# Patient Record
Sex: FEMALE | Race: WHITE
[De-identification: names, ages, dates, MRNs, and addresses within clinical notes are randomized per-mention and may not be internally consistent; named-entity substitution may affect disease eponyms.]

---

## 2018-09-28 ENCOUNTER — HOSPITAL ENCOUNTER (EMERGENCY)
Dept: HOSPITAL 77 - KA.ED | Age: 83
Discharge: HOME | End: 2018-09-28
Payer: MEDICARE

## 2018-09-28 DIAGNOSIS — S09.90XA: Primary | ICD-10-CM

## 2018-09-28 DIAGNOSIS — S00.03XA: ICD-10-CM

## 2018-09-28 DIAGNOSIS — S00.31XA: ICD-10-CM
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
## 2018-09-28 NOTE — EDM.PDOC
ED HPI GENERAL MEDICAL PROBLEM





- General


Chief Complaint: General


Stated Complaint: FELL AND HIT HER HEAD


Time Seen by Provider: 09/28/18 17:55


Source of Information: Reports: Patient, Family (daughters)


History Limitations: Reports: No Limitations





- History of Present Illness


INITIAL COMMENTS - FREE TEXT/NARRATIVE: 





Pleasant 85-year-old female in her normal state of health was taking some peach 

towels she hung out to dry on her clothes  and was trying to retrieve 

these because the wind was picking up, she lost her footing and tripped on the 

sidewalk falling directly onto her face. She noticed bleeding immediately 

coming from her nose. She was unable to get up on her own and pushed her 

Lifeline that she wears around her neck. Her daughter was notified and she seen 

that her mother had an abrasions and was bleeding from the nose and had a 

swelling on her head frontal lobe. Patient denies any loss of consciousness. 

She denies headache. She denies double vision. She denies jaw pain or 

difficulty talking. She denies increased pain or discomfort earlier difficulty 

with balance when she was able to stand for ambulation. She has no numbness or 

tingling in her extremities she denies any ear pain or bleeding from the ears. 

She has no dental pain or bleeding from the mouth. She denies any difficulty 

breathing, labored breathing or shortness of breath. She she denies chest pain. 

She was brought in by her daughter to the emergency room to be evaluated. She 

is not on any blood thinners or anticoagulation therapy


Onset: Today


Onset Date: 09/28/18


Duration: Minutes:


Location: Reports: Head, Face


Quality: Reports: Throbbing


Severity: Mild


Improves with: Reports: Rest


Worsens with: Reports: None


Associated Symptoms: Reports: No Other Symptoms.  Denies: Confusion, Chest Pain

, Headaches, Nausea/Vomiting, Seizure, Shortness of Breath, Syncope, Weakness





- Related Data


 Allergies











Allergy/AdvReac Type Severity Reaction Status Date / Time


 


No Known Drug Allergies Allergy  Cannot Verified 09/28/18 18:05





   Remember  











Home Meds: 


 Home Meds





. [No Known Home Meds]  09/28/18 [History]











Social & Family History





- Tobacco Use


Smoking Status *Q: Never Smoker


Second Hand Smoke Exposure: No





- Caffeine Use


Caffeine Use: Reports: Coffee, Soda





- Recreational Drug Use


Recreational Drug Use: No





ED ROS GENERAL





- Review of Systems


Review Of Systems: ROS reveals no pertinent complaints other than HPI.





ED EXAM, GENERAL





- Physical Exam


Exam: See Below


Exam Limited By: No Limitations


General Appearance: Alert, WD/WN, No Apparent Distress


Eye Exam: Bilateral Eye: EOMI, PERRL


Ears: Normal External Exam, Hearing Grossly Normal, Normal TMs


Nose: Other (Abrasion over the nose. Patient has no tenderness to palpation or 

crepitation of the nose)


Throat/Mouth: Normal Inspection, Normal Lips, Normal Oropharynx, Normal Voice, 

No Airway Compromise


Head: Other (Abrasion over the frontal lobe of the head with hematoma of the 

scalp frontal lobe)


Neck: Normal Inspection, Supple, Non-Tender, Full Range of Motion


Respiratory/Chest: No Respiratory Distress, Lungs Clear, Normal Breath Sounds


Cardiovascular: Normal Peripheral Pulses, Regular Rate, Rhythm


GI/Abdominal: Soft


Back Exam: Normal Inspection, Full Range of Motion


Extremities: Normal Inspection, Normal Range of Motion, Non-Tender, Normal 

Capillary Refill


Neurological: Alert, Oriented, CN II-XII Intact, Normal Cognition, Normal Gait, 

No Motor/Sensory Deficits


Psychiatric: Normal Affect, Normal Mood


Skin Exam: Other (Abrasions that were cleaned up with the watering triple 

antibiotic placed over the frontal aspect of the head and scalp and nose)


Lymphatic: No Adenopathy





Course





- Vital Signs


Last Recorded V/S: 


 Last Vital Signs











Temp  97.3 F   09/28/18 17:57


 


Pulse  88   09/28/18 17:57


 


Resp  20   09/28/18 17:57


 


BP  175/100 H  09/28/18 17:57


 


Pulse Ox  95   09/28/18 17:57














- Orders/Labs/Meds


Orders: 


 Active Orders 24 hr











 Category Date Time Status


 


 Head wo Cont [CT] Stat Exams  09/28/18 18:02 Ordered














- Radiology Interpretation


CT Results Date: 09/28/18


CT Results Time: 19:40





- Re-Assessments/Exams


Free Text/Narrative Re-Assessment/Exam: 





09/28/18 19:44


CT head wo iv





Findings:





Soft tissue swelling seen in the frontal region.


There is no mass or mass effect


There are no extra-axial fluid collection


There is no hemorrhage or hydrocephalus


There are no sites of abnormal attenuation





Impression:





No plain CT evidence of acute intracranial process








Departure





- Departure


Time of Disposition: 19:46


Disposition: Home, Self-Care 01


Condition: Good


Clinical Impression: 


Head trauma


Qualifiers:


 Encounter type: initial encounter Qualified Code(s): S09.90XA - Unspecified 

injury of head, initial encounter





Hematoma of frontal scalp


Qualifiers:


 Encounter type: initial encounter Qualified Code(s): S00.03XA - Contusion of 

scalp, initial encounter








- Discharge Information


Instructions:  Head Injury, Adult


Referrals: 


PCP,Not In Area [Primary Care Provider] - 


Forms:  ED Department Discharge


Additional Instructions: 


1. Patient should have observation over the next 12-14 hours.


2. Any change in mental status patient should be brought back into the 

emergency room for reevaluation.


3. Follow-up with your primary care next week just for quick recheck.





- My Orders


Last 24 Hours: 


My Active Orders





09/28/18 18:02


Head wo Cont [CT] Stat 














- Assessment/Plan


Last 24 Hours: 


My Active Orders





09/28/18 18:02


Head wo Cont [CT] Stat 











Assessment:: 





1. Head trauma


2. Frontal scalp hematoma


Plan: 





1. Change dressing in 3 days.


2. Keep finger clean and dry.


3. F/U with Primary care if redness or signs of infection are concern

## 2019-04-06 ENCOUNTER — HOSPITAL ENCOUNTER (INPATIENT)
Dept: HOSPITAL 52 - LL.MS | Age: 84
LOS: 13 days | Discharge: HOME | DRG: 561 | End: 2019-04-19
Attending: FAMILY MEDICINE | Admitting: EMERGENCY MEDICINE
Payer: MEDICARE

## 2019-04-06 DIAGNOSIS — R42: ICD-10-CM

## 2019-04-06 DIAGNOSIS — G89.29: ICD-10-CM

## 2019-04-06 DIAGNOSIS — M81.0: ICD-10-CM

## 2019-04-06 DIAGNOSIS — Z79.899: ICD-10-CM

## 2019-04-06 DIAGNOSIS — E11.9: ICD-10-CM

## 2019-04-06 DIAGNOSIS — E66.9: ICD-10-CM

## 2019-04-06 DIAGNOSIS — Z96.652: ICD-10-CM

## 2019-04-06 DIAGNOSIS — H91.90: ICD-10-CM

## 2019-04-06 DIAGNOSIS — E78.00: ICD-10-CM

## 2019-04-06 DIAGNOSIS — Z47.1: Primary | ICD-10-CM

## 2019-04-06 DIAGNOSIS — M54.9: ICD-10-CM

## 2019-04-06 DIAGNOSIS — Z79.84: ICD-10-CM

## 2019-04-06 DIAGNOSIS — R55: ICD-10-CM

## 2019-04-06 DIAGNOSIS — E83.42: ICD-10-CM

## 2019-04-06 RX ADMIN — METFORMIN HYDROCHLORIDE SCH MG: 500 TABLET, EXTENDED RELEASE ORAL at 20:07

## 2019-04-06 NOTE — PCM.HP
H&P History of Present Illness





- General


Date of Service: 19


Admit Problem/Dx: 


 Admission Diagnosis/Problem





Admission Diagnosis/Problem      Arthroplasty of knee








Source of Information: Patient, Family


History Limitations: Reports: No Limitations





- History of Present Illness


Initial Comments - Free Text/Narative: 





Patient admitted for PT and OT s/p left TKA performed at [facility]. 


Has been doing well post-operatively. 





- Related Data


Allergies/Adverse Reactions: 


 Allergies











Allergy/AdvReac Type Severity Reaction Status Date / Time


 


No Known Drug Allergies Allergy  Cannot Verified 18 18:05





   Remember  











Home Medications: 


 Home Meds





Acetaminophen 325 mg PO Q6HR 19 [History]


Apixaban [Eliquis] 2.5 mg PO BID 19 [History]


Celecoxib 1 cap PO BID 19 [History]


Ondansetron [Zofran] 8 mg PO Q4H PRN 19 [History]


Polyethylene Glycol 3350 [MiraLAX] 17 gm PO DAILY 19 [History]


metFORMIN [Glucophage XR] 500 mg PO BEDTIME 19 [History]


oxyCODONE 1 tab PO Q6HR PRN 19 [History]


traMADol [Ultram] 1 tab PO Q8HR 19 [History]











Past Medical History


HEENT History: Reports: Hard of Hearing, Other (See Below)


Other HEENT History: bilat hearing aides


Cardiovascular History: Reports: High Cholesterol


Respiratory History: Reports: Other (See Below)


Other Respiratory History: oxygen usage at night


Genitourinary History: Reports: Urinary Incontinence


OB/GYN History: Reports: None


Musculoskeletal History: Reports: Arthritis, Back Pain, Chronic, Osteoarthritis

, Osteoporosis


Neurological History: Reports: None


Endocrine/Metabolic History: Reports: Diabetes, Type II, Obesity/BMI 30+


Dermatologic History: Reports: None





- Infectious Disease History


Infectious Disease History: Reports: None





- Past Surgical History


Cardiovascular Surgical History: Reports: None


Respiratory Surgical History: Reports: None


GI Surgical History: Reports: Colonoscopy


Female  Surgical History: Reports:  Section


Neurological Surgical History: Reports: None


Musculoskeletal Surgical History: Reports: Arthroscopic Knee


Dermatological Surgical History: Reports: None





Social & Family History





- Caffeine Use


Caffeine Use: Reports: Coffee, Soda





- Alcohol Use


Alcohol Use History: No





- Recreational Drug Use


Recreational Drug Use: No


Drug Use in Last 12 Months: No





H&P Review of Systems





- Review of Systems:


Review Of Systems: See Below


General: Reports: No Symptoms


HEENT: Reports: Glasses, Hearing Changes (wears hearing aids)


Pulmonary: Reports: No Symptoms


Cardiovascular: Reports: No Symptoms.  Denies: Chest Pain


Gastrointestinal: Reports: Nausea (intermittent nausea since surgery).  Denies: 

Constipation, Diarrhea, Distension


Genitourinary: Reports: No Symptoms


Musculoskeletal: Reports: Other (has some generalized chronic baseline 

arthralgias.  s/p left TKA. )


Skin: Reports: Other (healing incision left knee)


Psychiatric: Reports: No Symptoms


Neurological: Reports: No Symptoms


Hematologic/Lymphatic: Reports: No Symptoms





Exam





- Exam


Exam: See Below





- Exam


General: Alert, Oriented, Cooperative


HEENT: Conjunctiva Clear, EACs Clear, EOMI, Hearing Intact (with hearing aids), 

Mucosa Moist & Pink, Nares Patent, Pupils Equal, Pupils Reactive


Neck: Supple, Trachea Midline


Lungs: Clear to Auscultation, Normal Respiratory Effort


Cardiovascular: Regular Rate, Regular Rhythm


GI/Abdominal Exam: Normal Bowel Sounds, Soft, Non-Tender, No Distention


 (Female) Exam: Deferred


Rectal (Female) Exam: Deferred


Back Exam: Normal Inspection.  No: Muscle Spasm, Paraspinal Tenderness, 

Vertebral Tenderness


Extremities: Normal Capillary Refill, Other (mild discomfort with palpation 

left knee, bandage left in place over incision site)


Skin: Warm, Dry


Neurological: Strength Equal Bilateral, Normal Speech, Normal Tone


Neuro Extensive - Mental Status: Alert, Oriented x3, Normal Mood/Affect, Normal 

Cognition, Memory Intact





- Problem List


(1) S/P total knee arthroplasty


SNOMED Code(s): 1768994101703, 189084897, 4590800088671


   ICD Code: Z96.659 - PRESENCE OF UNSPECIFIED ARTIFICIAL KNEE JOINT   Status: 

Acute   Priority: High   Current Visit: Yes   Problem Details: Admitted to 

Swing Bed for PT/OT s/p TKA   


Qualifiers: 


   Laterality: left   Qualified Code(s): Z96.652 - Presence of left artificial 

knee joint   





(2) Diabetes mellitus, type 2


SNOMED Code(s): 51555883


   ICD Code: E11.9 - TYPE 2 DIABETES MELLITUS WITHOUT COMPLICATIONS   Status: 

Chronic   Priority: Low   Current Visit: Yes   Problem Details: Stable per 

Ca.  Will perform daily bedside glucose checks and A1C and follow patient'

s trends.  Patient currently on Metformin.  Blood glucose levels have recently 

improved over her recent hospitalization most likely due to dietary changes and 

decreased overall PO intake.    


Qualifiers: 


   Diabetes mellitus long term insulin use: without long term use   Diabetes 

mellitus complication status: without complication   Qualified Code(s): E11.9 - 

Type 2 diabetes mellitus without complications   





(3) Hyperlipidemia


SNOMED Code(s): 91945301


   ICD Code: E78.5 - HYPERLIPIDEMIA, UNSPECIFIED   Status: Chronic   Priority: 

Low   Current Visit: No   Problem Details: Stable per patient   





(4) Osteoarthritis


SNOMED Code(s): 535455733


   ICD Code: M19.90 - UNSPECIFIED OSTEOARTHRITIS, UNSPECIFIED SITE   Status: 

Acute   Priority: Low   Current Visit: No   Problem Details: generalized 

osteoarthritis, stable overall   


Qualifiers: 


   Osteoarthritis location: unspecified site 


Problem List Initiated/Reviewed/Updated: Yes


Orders Last 24hrs: 


 Active Orders 24 hr











 Category Date Time Status


 


 Patient Status [ADT] Routine ADT  19 13:31 Ordered


 


 Blood Glucose Check, Bedside [RC] BIDMEALS Care  19 13:31 Ordered


 


 Height and Weight [RC] UPON Care  19 13:31 Ordered


 


 May Shower [RC] ASDIRECTED Care  19 13:31 Ordered


 


 Oxygen Therapy [RC] PRN Care  19 13:31 Ordered


 


 Pulse Oximetry [RC] PRN Care  19 13:35 Ordered


 


 Up With Assistance [RC] ASDIRECTED Care  19 13:31 Ordered


 


 VTE/DVT Education [RC] PER UNIT ROUTINE Care  19 13:31 Ordered


 


 Vital Signs [RC] QSHIFT Care  19 13:31 Ordered


 


 Consult to Case Management/ [CONS] Cons  19 13:31 Ordered





 Routine   


 


 OT Evaluation and Treatment [CONS] Routine Cons  19 13:31 Ordered


 


 PT Evaluation and Treatment [CONS] Routine Cons  19 13:31 Ordered


 


 American Diabetic [employer] Diet [DIET] Diet  19 Dinner Ordered


 


 CBC WITH AUTO DIFF [HEME] AM Lab  19 05:15 Ordered


 


 COMPREHENSIVE METABOLIC PN,CMP [CHEM] AM Lab  19 05:11 Ordered


 


 GLYCOSYLATED HEMOGLOBIN,HGBA1C [CHEM] Routine Lab  19 13:39 Ordered


 


 MAGNESIUM [CHEM] AM Lab  19 05:11 Ordered


 


 Acetaminophen [Tylenol] Med  19 16:00 Ordered





 325 mg PO Q6HR   


 


 Apixaban [Eliquis] Med  19 18:00 Ordered





 2.5 mg PO BID   


 


 Bisacodyl [Dulcolax] Med  19 13:31 Ordered





 5 mg PO DAILY PRN   


 


 Celecoxib [Celecoxib] Med  19 18:00 Ordered





 1 cap PO BID   


 


 Cholecalciferol (Vitamin D3) [Vitamin D3] Med  19 08:00 Ordered





 1,000 units PO DAILY   


 


 Magnesium Oxide Med  19 08:00 Ordered





 400 mg PO DAILY   


 


 Ondansetron [Zofran ODT] Med  19 13:31 Ordered





 4 mg PO Q6H PRN   


 


 Polyethylene Glycol 3350 [MiraLAX] Med  19 08:00 Ordered





 17 gm PO DAILY   


 


 Temazepam [Restoril] Med  19 13:31 Ordered





 15 mg PO BEDTIME PRN   


 


 metFORMIN [Glucophage XR] Med  19 20:00 Ordered





 500 mg PO BEDTIME   


 


 oxyCODONE Med  19 13:39 Ordered





 5 mg PO Q6HR PRN   


 


 traMADol [Ultram] Med  19 16:00 Ordered





 50 mg PO Q8HR   


 


 Resuscitation Status Routine Resus Stat  19 13:31 Ordered








 Medication Orders





Acetaminophen (Tylenol)  325 mg PO Q6HR [facility]


Apixaban (Eliquis)  2.5 mg PO BID JOSSY


Bisacodyl (Dulcolax)  5 mg PO DAILY PRN


   PRN Reason: Constipation


Cholecalciferol (Vitamin D3)  1,000 units PO DAILY [facility]


Magnesium Oxide (Magnesium Oxide)  400 mg PO DAILY JOSSY


Metformin HCl (Glucophage Xr)  500 mg PO BEDTIME [facility]


Non-Formulary Medication (Celecoxib [Celecoxib])  1 cap PO BID JOSSY


Ondansetron HCl (Zofran Odt)  4 mg PO Q6H PRN


   PRN Reason: Nausea/Vomiting


Oxycodone HCl (Oxycodone)  5 mg PO Q6HR PRN


   PRN Reason: Severe Pain


Polyethylene Glycol (Miralax)  17 gm PO DAILY JOSSY


Temazepam (Restoril)  15 mg PO BEDTIME PRN


   PRN Reason: Sleep


Tramadol HCl (Ultram)  50 mg PO Q8HR [facility]








Assessment/Plan Comment:: 





as above.

## 2019-04-07 LAB
CHLORIDE SERPL-SCNC: 106 MMOL/L (ref 98–107)
HBA1C MFR BLD: 6.3 % (ref 4.3–5.7)
SODIUM SERPL-SCNC: 140 MMOL/L (ref 136–145)

## 2019-04-07 RX ADMIN — METFORMIN HYDROCHLORIDE SCH MG: 500 TABLET, EXTENDED RELEASE ORAL at 19:49

## 2019-04-07 RX ADMIN — VITAMIN D, TAB 1000IU (100/BT) SCH UNITS: 25 TAB at 08:22

## 2019-04-07 RX ADMIN — ONDANSETRON PRN MG: 4 TABLET, ORALLY DISINTEGRATING ORAL at 21:26

## 2019-04-08 RX ADMIN — METFORMIN HYDROCHLORIDE SCH MG: 500 TABLET, EXTENDED RELEASE ORAL at 19:30

## 2019-04-08 RX ADMIN — VITAMIN D, TAB 1000IU (100/BT) SCH UNITS: 25 TAB at 08:01

## 2019-04-08 NOTE — XMSREPORT
Summary of Care

 Created on:2019



Patient:Marika Scott

Sex:Female

:1933

External Reference #:[id number]





Demographics







 Address  [address] [facility]



   [place] [address]

 

 Mobile Phone  [phone]

 

 Home Phone  [phone]

 

 Preferred Language  English

 

 Marital Status  Unknown

 

 Voodoo Affiliation  Unknown

 

 Race  White

 

 Ethnic Group  Not  or 









Author







 Organization  [facility]

 

 Address  [address] [place] [address] [address]



   [place] [address]









Support







 Name  Relationship  Address  Phone

 

 ОЛЬГА ACEVEDO  Child  Unavailable  [phone]



     [place] [address]  

 

 CLINT[place] ACEVEDO  Child  Unavailable  [phone]









Care Team Providers







 Name  Role  Phone

 

 Cindy Mcgill  Primary Care Provider  [phone]

 

 Cindy Mcgill  Attributed Provider  [phone]









Reason for Visit

Auth/Cert (Routine)





 Status  Reason  Specialty  Diagnoses /  Referred By  Referred To



       Procedures  Contact  Contact

 

   Continuity of    Diagnoses



Bilateral primary osteoarthritis of knee  Kyle Pan MD



  



   Care    



Procedures



ARTHROPLASTY KNEE CONDYLE & PLATEAU MEDIAL [facility] LAT COMPARTMENTS [facility]  [address] [place] [address]



  



         Phone:  



         [phone]



  



         Fax:  



         [phone]  









Encounter Details







 Date  Type  Department  Care Team  Description

 

 2019 -  Hospital Encounter  [facility]  Kyle Pan,  Diabetes type 2,



 2019    [place] MS



  MD



  controlled ([facility])



     [address] [place] [address]



  [place], [place]  



     [phone]  [facility]



  



       [phone]  



       (Fax)  







Allergies







 Active Allergy  Reactions  Severity  Noted Date  Comments

 

 Hmg-Coa-R Inhibitors  Statin Contraindication -    2019  Severe muscle



   Intolerance      aches



documented as of this encounter (statuses as of 2019)



Medications







 Medication  Sig  Dispensed  Refills  Start Date  End Date  Status

 

 metFORMIN  Take 1 tablet  30 tablet  2  2019  Active



 (GLUCOPHAGE XR)  (500 mg) by        9  



 500 mg extended  mouth every          



 release  night at bedtime          



 tabletIndications:            



 Type 2 diabetes            



 mellitus without            



 complication,            



 without long-term            



 current use of            



 insulin ([facility])            

 

 apixaban (ELIQUIS)  Take 1 tablet  18 tablet  0  2019  04/15/201  Active



 2.5 MG  (2.5 mg) by        9  



 tabletIndications:  mouth 2 times a          



 Prophylaxis of DVT  day for 9 days          



 in Orthopedic  Indications:          



 Surgery  Prophylaxis of          



   Deep Vein          



   Thrombosis in          



   Orthopedic          



   Surgery          

 

 acetaminophen  Take 2 tablets  40 tablet  0  2019  Active



 (TYLENOL) 325 mg  (650 mg) by        9  



 tabletIndications:  mouth Every 6          



 Status post total  hours for 5 days          



 left knee            



 replacement            

 

 celecoxib  Take 1 capsule  10 capsule  0  2019  Active



 (CELEBREX) 200 mg  (200 mg) by        9  



 capsuleIndications  mouth 2 times a          



 : Status post  day for 5 days          



 total left knee            



 replacement            

 

 ondansetron  Take 1 tablet (8  20 tablet  0  2019    Active



 (ZOFRAN ODT) 8 mg  mg) by mouth          



 dispersible  Every 4 hours as          



 tabletIndications:  needed for          



 Status post total  nausea or          



 left knee  vomiting          



 replacement            

 

 oxyCODONE (OXY-IR)  Take 1 tablet (5  10 tablet  0  2019    Active



 5 mg tablet  mg) by mouth          



 (immediate  every 6 hours as          



 release)Indication  needed for          



 s: Status post  severe pain          



 total left knee            



 replacement            

 

 polyethylene  Take 1 packet by  5 packet  0  2019  Active



 glycol (MIRALAX)  mouth 1 time per        9  



 packetIndications:  day for 5 days          



 Status post total  Dissolve in 4 to          



 left knee  8 ounces of          



 replacement  water, juice,          



   soda, coffee,          



   tea.          

 

 traMADol (ULTRAM)  Take 1 tablet  18 tablet  0  2019  Active



 50 mg  (50 mg) by mouth        9  



 tabletIndications:  Every 8 hours          



 Status post total  for 6 days          



 left knee            



 replacement            

 

 naproxen (ALEVE)  Take 440 mg by    0      Discontinued



 220 mg tablet  mouth 2 times a        9  



   day          

 

 metFORMIN  Take 1 tablet  30 tablet  2  2019  Discontinued



 (GLUCOPHAGE XR)  (500 mg) by        9  



 500 mg extended  mouth every          



 release  night at bedtime          



 tabletIndications:            



 Type 2 diabetes            



 mellitus without            



 complication,            



 without long-term            



 current use of            



 insulin ([facility])            



documented as of this encounter (statuses as of 2019)



Active Problems







 Problem  Noted Date

 

 Diabetes type 2, controlled  2019

 

 Status post total left knee replacement  2019

 

 Osteoporosis  2019

 

 Lumbar radiculitis  10/01/2013

 

 Low back pain  10/01/2013

 

 Low back pain radiating to left leg  10/01/2013

 

 Other and unspecified hyperlipidemia  



documented as of this encounter (statuses as of 2019)



Immunizations







 Name  Dates Previously Given  Next Due

 

 FLU VACCINE HIGH DOSE 65YR+  10/10/2018, 10/12/2016, 10/03/2014,  



   10/11/2013, 10/08/2012, 10/07/2011  

 

 FLU VACCINE HIGH DOSE 65YR+(Fluzone)  2017, 10/16/2015  

 

 H1N1 Vaccine W/preservative 3yr+  2010  

 

 Influenza Vaccine,unspecified  10/13/2010, 2009, 10/28/2008,  



   10/23/2007  

 

 Pneumococcal Conj PCV13  10/12/2016  

 

 Pneumococcal Polysaccharide PPSV23  10/29/2001  

 

 Zoster Live(Zostavax)  10/03/2014  



documented as of this encounter



Social History







 Tobacco Use  Types  Packs/Day  Years Used  Date

 

 Never Smoker        









 Smokeless Tobacco: Never Used      









 Alcohol Use  Drinks/Week  oz/Week  Comments

 

 No      









 Sex Assigned at Birth  Date Recorded

 

 Not on file  









 Job Start Date  Occupation  Industry

 

 Not on file  Not on file  Not on file









 Travel History  Travel Start  Travel End









 No recent travel history available.



documented as of this encounter



Last Filed Vital Signs







 Vital Sign  Reading  Time Taken

 

 Blood Pressure  115/60  2019  7:46 AM CDT

 

 Pulse  74  2019  7:46 AM CDT

 

 Temperature  36.7 C (98 F)  2019  7:46 AM CDT

 

 Respiratory Rate  16  2019  7:46 AM CDT

 

 Oxygen Saturation  93%  2019  7:46 AM CDT

 

 Inhaled Oxygen Concentration  -  -

 

 Weight  73 kg (160 lb 15 oz)  2019 10:45 AM CDT

 

 Height  160 cm (5' 2.99")  2019 10:45 AM CDT

 

 Body Mass Index  28.52  2019 10:45 AM CDT



documented in this encounter



Functional Status







 Functional Status  Response  Date of Assessment

 

 Is the person deaf or does he/she have serious difficulty  No  2019



 hearing?    

 

 Is this person blind or does he/she have difficulty  No  2019



 seeing even when wearing glasses?    

 

 Do you have difficulty with walking, balance, climbing  Yes  2019



 stairs, or had a fall in the last 3 months?    

 

 Does the patient have difficulty dressing or bathing?  No  2019

 

 Because of a physical, mental, or emotional condition;  No  2019



 does this person have difficulty doing errands alone such    



 as visiting a doctor's office or shopping?    









 Cognitive Status  Response  Date of Assessment

 

 Because of a physical, mental, or emotional condition;  No  2019



 does this person have serious difficulty concentrating,    



 remembering, or making decisions?    



documented as of this encounter



Discharge Summaries

Aden Prado MD - 2019 10:22 AM CDTFormatting of this note might be 
different from the original.

Discharge Summary



Patient ID: Marika Ny is a 85yr female.

Attending Physician: Kyle Pan MD

Discharging Provider Specialty: Orthopedic Surgery

Admit Date: 4/3/2019

Discharge Date: 2019

Primary Care Physician: SHERYL Begum

Code Status: No Order



Primary Discharge Diagnoses

&lt;principal problem not specified&gt;

Active Problems:

  Status post total left knee replacement

  Diabetes type 2, controlled (HCC)

  Low back pain

Osteoporosis



Secondary Discharge Diagnoses



Patient Active Problem List

Diagnosis

 Other and unspecified hyperlipidemia

 Lumbar radiculitis

 Low back pain

 Low back pain radiating to left leg

 Diabetes type 2, controlled (HCC)

 Status post total left knee replacement

 Osteoporosis



Hospital Course

85-year-old female was admitted to orthopedic service under after undergoing 
LEFT tka. -  arthroplasty. Postoperatively, patient had significant nausea 
despite IV Zofran and IV Reglan. Finally she improved with SCOPOLAMINE PATCH. 
Pain is decently controlled on Ultram. She is stable for transfer to 
nursing home today. Will make sure she has sufficient supply of pain 
medications and antiemetics. Plan of care has been discussed with patient, 
all questions answered. 

As far as her DM goes, with her age and hgba1c level below 7, she could 
probably even stop her low dose metformin.



Procedures Performed and Findings

Procedure(s):

LEFT TOTAL KNEE ARTHROPLASTY - Wound Class: Clean



Discharge Exam

Vital Signs: Temp: 98 F (36.7 C) | BP: 115/60 | Pulse: 74 | Resp: 16 | Pain 
Rating: 3 (out of 10) | Weight: 73 kg (160 lb 15 oz) | O2 Device: Room Air

O2 Flow Rate (L/min): 3 l/min | SpO2: 93 %

Intake and Output:  0700 -  0659

In: 10

Out: 800 [Urine:800]

Physical Exam

Constitutional: She is oriented to person, place, and time. She appears well-
developed and well-nourished.

HENT:

Head: Normocephalic.

Cardiovascular: Normal rate.

Pulmonary/Chest: Effort normal.

Abdominal: Soft.

Neurological: She is alert and oriented to person, place, and time.

Skin: Skin is warm.

Psychiatric: She has a normal mood and affect.

Nursing note and vitals reviewed.



Consults

SPIRITUAL CARE REFERRAL

INTERNAL MEDICINE CONSULT

CASE MANAGEMENT CONSULT



Discharge Disposition

ADULT Discharge Planning: Swingbed, non-Skilled ()



Discharge Medications





Medication List



START taking these medications

acetaminophen 325 mg tablet

Commonly known as:  TYLENOL

Take 2 tablets (650 mg) by mouth Every 6 hours for 5 days



apixaban 2.5 MG tablet

Commonly known as:  ELIQUIS

Take 1 tablet (2.5 mg) by mouth 2 times a day for 9 days Indications: 
Prophylaxis of Deep Vein Thrombosis in Orthopedic Surgery



celecoxib 200 mg capsule

Commonly known as:  celeBREX

Take 1 capsule (200 mg) by mouth 2 times a day for 5 days



ondansetron 8 mg dispersible tablet

Commonly known as:  ZOFRAN ODT

Take 1 tablet (8 mg) by mouth Every 4 hours as needed for nausea or vomiting



oxyCODONE 5 mg tablet (immediate release)

Commonly known as:  OXY-IR

Take 1 tablet (5 mg) by mouth every 6 hours as needed for severe pain



polyethylene glycol packet

Commonly known as:  MIRALAX

Take 1 packet by mouth 1 time per day for 5 days Dissolve in 4 to 8 ounces of 
water, juice, soda, coffee, tea.

Start taking on:  2019



traMADol 50 mg tablet

Commonly known as:  ULTRAM

Take 1 tablet (50 mg) by mouth Every 8 hours for 6 days





CONTINUE taking these medications

metFORMIN 500 mg extended release tablet

Commonly known as:  GLUCOPHAGE XR

Take 1 tablet (500 mg) by mouth every night at bedtime





STOP taking these medications

naproxen 220 mg tablet

Commonly known as:  ALEVE







Where to Get Your Medications



These medications were sent to [facility] [address]  [place] [id number]

 Phone:  [phone]

 acetaminophen 325 mg tablet

 apixaban 2.5 MG tablet

 celecoxib 200 mg capsule

 ondansetron 8 mg dispersible tablet

 oxyCODONE 5 mg tablet (immediate release)

 polyethylene glycol packet

 traMADol 50 mg tablet



Information about where to get these medications is not yet available

Ask your nurse or doctor about these medications

 metFORMIN 500 mg extended release tablet





Discharge Instructions

See AVS for discharge instructions.



Tests Pending at Discharge



Follow-Up Scheduled

See AVS for Follow up appointments made



Medical Decision Making

The time spent on discharge coordination was 32 minutes.Electronically signed 
by Aden Prado MD at 2019 10:25 AM CDTdocumented in this encounter



Progress Notes

Phan Mortensen PA - 2019 12:48 PM CDTFormatting of this note might be 
different from the original.

ORTHO progress note.



Marika Ny is a 85yr old female admitted on 4/3/2019 10:18 AM



2 Days Post-Op

Status Post:  Procedure(s):

LEFT TOTAL KNEE ARTHROPLASTY



 Patient doing ok, complains of mild pain with current meds.  The patient 
denies nausea. States thather nausea has gotten much better and that she has 
been able to eat. Patient is a little sore after PT but feels good.



Lab Results

Component Value Date

 HEMOGLOBIN 12.1 2019





Current Vital Signs

Temp: 97.6 F (36.4 C) BP: 130/58 Pulse: 86 O2 Device: Room Air

O2 Flow Rate (L/min): 3 l/min

Resp: 17 Pain Ratin (out of 10) Weight: 73 kg (160 lb 15 oz) SpO2: 92 %



Dist NVI.  Dressings Clean &amp; Dry. Homans negative. Compartments soft. EHL/PF
/DF intact.



Walked the hallways a coupe times with PT.



Plan:  Continue cares, PT. PLan is to DC to [facility] bed tomorrow.



Phan Mortensen, PAElectronically signed by Phan Mortensen PA at 2019 12:50 PM DOMINGATAden Prado MD - 2019 11:12 AM CDTFormatting of 
this note might be different from the original.



DAILY PROGRESS NOTE



Marika Ny is a 85yr old female admitted on 4/3/2019 10:18 AM.



Impression / Plan

Active Problems:

Post op nausea - likely from pain meds, anesthesia. Resolving with scop patch

dizziness - negative orthostatics, resolved. prob anesthesia effect

  Diabetes type 2, controlled (HCC) - well controlled.

  Low back pain

  Status post total left knee replacement - [facility] saturday

  Osteoporosis

dvt prevention - eliquis



The plan is to discontinue IV fluids, continue with all current antiemetic 
regimen. Will plan for discharge to nursing home tentatively tomorrow. She 
is debating whether to take Medivan $325 versusgetting into her family is pick 
up truck which might be hard to get into.



Interval History

HPI

Patient reports significant improvement in her nausea after initiation of 
scopolamine patch. She tolerated breakfast for the first time today. Her 
appetite is still a little bit affected though. She is having more pain in 
the left knee. She had no bowel movements but feels that one is coming.

Review of Systems

Review of Systems

Respiratory: Negative for shortness of breath.

Cardiovascular: Negative for chest pain.

Gastrointestinal: Positive for nausea and vomiting.



Physical Exam

Vital Signs: Temp: 97.6 F (36.4 C) | BP: 130/58 | Pulse: 86 | Resp: 17 | 
Pain Ratin (out of 10) | Weight: 73 kg (160 lb 15 oz) | O2 Device: Room Air

O2 Flow Rate (L/min): 3 l/min | SpO2: 92 %

Maximum Temperatures (last 24 hours)

 Temperature Maximum Max

 Temp  98.4 F (36.9 C)





Intake and Output:  0700 -  0659

In: 1572 [Oral:600]

Out: 950 [Urine:850; Emesis:100]

Physical Exam

Constitutional: She is oriented to person, place, and time. She appears well-
developed.

HENT:

Head: Normocephalic.

Cardiovascular: Normal rate.

Pulmonary/Chest: Effort normal. No stridor.

Abdominal: Soft. There is no tenderness.

Musculoskeletal:

Knee covered by dressing

Neurological: She is alert and oriented to person, place, and time.

Skin: Skin is warm.

Vitals reviewed.



Labs



Labs (Last day)



 19 1822 - 19 182

 GLUCOSE POINT OF CARE   19 1822

 GLUCOSE POINT OF CARE

 Glucose POC  (mg/dL) 175









Medical Decision making

MDM

Reviewed: vitals and nursing note

Reviewed previous: labs

Interpretation: labs



Electronically signed by Aden Prado MD at 2019 11:14 AM Aden Hanson MD - 2019  5:38 PM CDTFormatting of this note might be 
different from the original.



DAILY PROGRESS NOTE



Marika Ny is a 85yr old female admitted on 4/3/2019 10:18 AM.



Impression / Plan

Active Problems:

Post op nausea - likely from pain meds, anesthesia

dizziness - check orthostatics, but prob reaction to meds too

  Diabetes type 2, controlled (HCC) - well controlled.

  Low back pain

  Status post total left knee replacement

  Osteoporosis

dvt prevention - eliquis



Plan of care has been discussed with patient and her son at the bedside. Will 
try scopolamine patch in addition to the current antiemetic regimen, minimize 
opioids as she is already doing, continue with IV fluids. Continue therapies. 
Once nausea is controlled patient can hopefully go home, hopefully, tomorrow. 
Internal medicine service will follow



Interval History

HPI

Patient has no pain however, she reports significant nausea today, vomiting of 
her meals despite using both Zofran and Reglan IV. She is a bit dizzy when 
she stands up.

Review of Systems

Review of Systems

Respiratory: Negative for shortness of breath.

Cardiovascular: Negative for chest pain.

Gastrointestinal: Positive for nausea and vomiting.



Physical Exam

Vital Signs: Temp: 97.5 F (36.4 C) | BP: 134/61(sitting up) | Pulse: 70 | 
Resp: 16 | Pain Ratin (out of 10) | Weight: 73 kg (160 lb 15 oz) | O2 Device
: Room Air

O2 Flow Rate (L/min): 2 l/min | SpO2: 94 %

Maximum Temperatures (last 24 hours)

 Temperature Maximum Max

 Temp  97.6 F (36.4 C)





Intake and Output:  0700 -  0659

In:  [Oral:200]

Out:  [Emesis:]

Physical Exam

Constitutional: She is oriented to person, place, and time. She appears well-
developed.

HENT:

Head: Normocephalic.

Cardiovascular: Normal rate.

Pulmonary/Chest: Effort normal. No stridor.

Abdominal: Soft. There is no tenderness.

Musculoskeletal:

Knee covered by dressing

Neurological: She is alert and oriented to person, place, and time.

Skin: Skin is warm.

Vitals reviewed.



Labs



Labs (Last day)



 19 0530 - 19 0530

 CBC   19 0530

 CBC

 Hemoglobin 11.5-15.8 (g/dL) 12.1





 19 0854 - 19 0854

 GLUCOSE POINT OF CARE   19 0854

 GLUCOSE POINT OF CARE

 Glucose POC  (mg/dL) 167









Medical Decision making

MDM

Reviewed: vitals and nursing note

Reviewed previous: labs

Interpretation: labs



Electronically signed by Aden Prado MD at 2019  5:41 PM Phan Patel PA - 2019  4:04 PM CDTFormatting of this note might be 
different from the original.

ORTHO progress note.



Marika Ny is a 85yr old female admitted on 4/3/2019 10:18 AM



1 Day Post-Op

Status Post:  Procedure(s):

LEFT TOTAL KNEE ARTHROPLASTY



 Patient doing ok, complains of mild pain with current meds.  The patient 
complains of nausea. States that she gets dizzy and can't keep any food down.



Lab Results

Component Value Date

 HEMOGLOBIN 12.1 2019





Current Vital Signs

Temp: 97.5 F (36.4 C) BP: 134/61(sitting up) Pulse: 70 O2 Device: Room Air

O2 Flow Rate (L/min): 2 l/min

Resp: 16 Pain Ratin (out of 10) Weight: 73 kg (160 lb 15 oz) SpO2: 94 %



Dist NVI.  Dressings Clean &amp; Dry. Homans negative. Compartments soft.  
Patient denies fever, chills, SOB. EHL/DF/PF intact.



X-rays reviewed, no complications seen



Plan:  Continue cares, PT. Continue to work on improving nausea.



Phan Mortensen, PAElectronically signed by Phan Mortensen PA at 2019  4:06 PM CDTLAnastasiya nayak RP - 2019  8:57 AM CDTAnticoagulation 
Education per Pharmacy

Ms. Rosendo Ny educated on apixaban.



Patient provided verbal and written education on indication; potential risks 
and benefits of treatment, including increased risk of bleeding; importance of 
compliance in treatment and monitoring; signs/symptoms of bleeding/clotting; 
when to seek medical attention; potential for drug-drug, drug-disease, and drug-
diet interactions.  Patient and son verbalized understanding of education and 
all questions. answered.  Thank you very much.

Anastasiya Meek [facility]



Electronically signed by Anastasiya Meek RPh at 2019  8:57 AM Anastasiya Prather RPh - 2019 10:53 AM CDTFormatting of this note might be 
different from the original.

4/3/2019 10:53



Patient was seen by pharmacy for medication reconciliation. Home medications 
have been reconciled and updated on the home medications list to match the 
patient's home usage.



Prior to Admission Medications

Prescriptions Last Dose Informant Patient Reported? Taking?

metFORMIN (GLUCOPHAGE XR) 500 mg extended release tablet 2019 at hs Self No 
Yes

Sig: Take 1 tablet (500 mg) by mouth every night at bedtime

naproxen (ALEVE) 220 mg tablet 3/31/2019 at pm Self Yes Yes

Sig: Take 440 mg by mouth 2 times a day



Facility-Administered Medications Last Administration Doses Remaining

denosumab (PROLIA) subcutaneous injection 60 mg 2018  1:27 PM





Anastasiya Meek [facility]

Electronically signed by Anastasiya Meek RPh at 2019 10:53 AM 
CDTdocumented in this encounter



Plan of Treatment







 Date  Type  Specialty  Care Team  Description

 

 2019  Office Visit  Orthopedics  Phan Mortensen PA



  



       [address] [place] [address]



  



       [phone] (Fax)  

 

 2019  Office Visit  Orthopedics  Kyle Pan MD



  



       [address] [place] [address]



  



       [phone] (Fax)  









 Health Maintenance  Due Date  Last Done  Comments

 

 Microalbumin  1933    

 

 Ophthalmology Exam  05/15/1943    

 

 Tetanus Vaccine  05/15/1951    

 

 DEXA/Heel Scan  05/15/1998    

 

 Zoster Vaccine (2 of 3 - Mixed  2014  10/03/2014  



 Series(ZVL first) - RZV,Shingrix)      

 

 Hemoglobin A1C Every 6 Months  2019, 10/10/2018,  



     03/10/2017, Additional history  



     exists  

 

 Lipid Screening  2020, 10/10/2018,  



     03/10/2017, Additional history  



     exists  

 

 Diabetic Foot Exam  2020  

 

 Pneumococcal 65yr+ Low/Med Risk  Completed  10/12/2016, 10/29/2001  

 

 Influenza Vaccine  Completed  10/10/2018, 2017,  



     10/12/2016, Additional history  



     exists  



documented as of this encounter



Implants







 Implanted  Type  Area    Device  Shelf  Model /



         Identifier  Expiration  Serial / Lot



           Date  

 

 Cmnt Bone Simplex Tobramyacin N 6197-9-001 Ea - Sn/A    Left: KNEE  ELLA  
  2020  [id number] /



 Implanted: Qty: 1 on 2019 by Kyle Pan MD            N/A /



             [id number]

 

 Knee Tib Stem Psn 5d Lft Shree N -649- Ea - Sn/A    Left: KNEE  THELMA
    2028  -833-01 /



 Implanted: Qty: 1 on 2019 by Kyle Pan MD            N/A /



             [id number]

 

 Knee Psn Asf Mc [id number] N -103-11 Ea/A    Left: KNEE  
THELMA    2023  -204-11 /



 Implanted: Qty: 1 on 2019 by Kyle Pan MD            N/A /



             [id number]

 

 Knee Fem Psn Cr Cmnt [id number] N -836-01 Ea - Sn/A    Left: KNEE  
THELMA    2028  -531-01 /



 Implanted: Qty: 1 on 2019 by Kyle Pan MD            N/A /



             [id number]

 

 Knee Psn Ptla All Pe Ve 32mm N -784-32 Ea - Sn/A    Left:  THELMA      -184-32 /



 Implanted: Qty: 1 on 2019 by Kyle Pan MD    [facility]        N/A /



             [id number]



documented as of this encounter



Procedures







 Procedure Name  Priority  Date/Time  Associated Diagnosis  Comments

 

 GLUCOSE BY METER,  Routine  2019  8:39    Results for this



 POCT    AM CDT    procedure are in



         the results



         section.

 

 GLUCOSE BY METER,  Routine  2019  6:22    Results for this



 POCT    PM CDT    procedure are in



         the results



         section.

 

 GLUCOSE BY METER,  Routine  2019  8:54    Results for this



 POCT    AM CDT    procedure are in



         the results



         section.

 

 HEMOGLOBIN  Routine  2019  5:30    Results for this



     AM CDT    procedure are in



         the results



         section.

 

 XRAY KNEE 1-2 VIEWS  Routine  2019  4:28    Results for this



 LT    PM CDT    procedure are in



         the results



         section.

 

 TISSUE EXAM  Routine  2019  2:42  Primary osteoarthritis  Results for 
this



     PM CDT  of both knees  procedure are in



         the results



         section.

 

 ARTHROPLASTY KNEE    2019  1:30  Primary osteoarthritis  



     PM CDT  of both knees  









   Special Needs







   *X*  PT REQ LATER CASE OF DAY,  around 10/11,  Diabetic









 GLUCOSE BY METER, POCT  Routine  2019 10:49 AM CDT    Results for this 
procedure



         are in the results section.



documented in this encounter



Results

GLUCOSE BY METER, POCT (2019  8:39 AM CDT)Only the most recent of4 
resultswithin the time period is included.





 Component  Value  Ref Range  Performed At

 

 Glucose POC  128 (H)  70 - 100 mg/dL  [facility]









 Specimen

 

 Blood - Blood









 Performing Organization  Address  City/[facility]/Zipcode  Phone Number

 

 [facility]  [address] [place] [address] [phone]



HEMOGLOBIN (2019  5:30 AM CDT)





 Component  Value  Ref Range  Performed At

 

 Hemoglobin  12.1  11.5 - 15.8 g/dL  [facility]









 Specimen

 

 Blood - Blood









 Performing Organization  Address  City/[facility]/Zipcode  Phone Number

 

 [facility]  [address] [place] [address] [phone]



XRAY KNEE 1-2 VIEWS LT (2019  4:28 PM CDT)





 Narrative  Performed At

 

  



  



  Patient Name: MARIKA SCOTT 



  



  YOB: 1933 



  



  Procedure: XRAY KNEE 1-2 VIEWS LT 



  



  Date of Service: 2019 



  



  



  



  



  



 EXAM: XRAY KNEE 1-2 VIEWS LT



  



  



  



 INDICATION: Post op x-ray 



  



  



  



 COMPARISON(S): 3/15/2017



  



  



  



 FINDINGS/IMPRESSION: Interval postoperative changes right knee. Gas in  



 the soft tissues. Vascular calcifications. Fatty infiltration  



 muscularature.



  



  



  



  



  



 Finalized by: Niko Colbert MD on 4/3/2019 4:38 PM



  



  



  



  Patient/Procedure Information:



  



  [facility] 



  



  MRN/DAVID: [id number] 



  



  Order Number: [id number] 



  



  Accession Number: [id number] 



  



  Ordering Provider: PHAN MORTENSEN 



  



  Authorizing Provider: PHAN MORTENSEN  









 Procedure Note

 

 Interface, Radiantres - 2019  4:40 PM CDT







  Patient Name:   MARIKA SCOTT



  YOB: 1933



  Procedure: XRAY KNEE 1-2 VIEWS LT



  Date of Service: 2019



 



 



 EXAM: XRAY KNEE 1-2 VIEWS LT



 



 INDICATION: Post op x-ray



 



 COMPARISON(S): 3/15/2017



 



 FINDINGS/IMPRESSION: Interval postoperative changes right knee. Gas in the 
soft tissues. Vascular calcifications. Fatty infiltration muscularature.



 



 



 Finalized by: Niko Colbert MD on 4/3/2019 4:38 PM



 



  Patient/Procedure Information:



  [facility]



  MRN/DAVID: [id number]



  Order Number: [id number]



  Accession Number: [id number]



  Ordering Provider: PHAN MORTENSEN



  Authorizing Provider: PHAN MORTENSEN









 Performing Organization  Address  City/State/Zipcode  Phone Number

 

       



TISSUE EXAM (2019  2:42 PM CDT)





 Component  Value  Ref Range  Performed At

 

 FINAL DIAGNOSIS  Bone and soft tissue, left knee, arthroplasty:    [facility]



   - Bone fragments with changes grossly consistent with degenerative joint 
disease, and soft tissue fragments without gross pathological abnormality (
gross examination only; see gross description).    



       



   STEVENL:nguyễn    

 

 GROSS DESCRIPTION  Received in formalin labeled "left knee bone and tissue" is 
a 13.1 x 9.8 x 1.4 cm aggregate of multiple yellow-tan bone and soft tissue 
fragments, including the tibial plateau and femoral condyles. The    [facility]



   patella is present. The articular surfaces show areas of eburnation, 
osteophyte formation and focal pitting.  The specimen is for gross only 
diagnosis, no sections are submitted.    



       



   MS:sl    

 

 MICROSCOPIC DESCRIPTION      [facility]



       



       

 

 CASE REPORT  Surgical Pathology Report
 Case: [id number]
    [facility]



   Authorizing Provider:Kyle Pan MDCollected:
 2019 1442    



   Ordering Location: [facility] Received:
2019    



   Pathologist: Michelle Gaviria MD

    



   Specimen:Knee, LEFT KNEE BONE AND TISSUE

    



       

 

 EMBEDDED IMAGES      [facility]









 Specimen

 

 Bone - Knee









 Performing Organization  Address  City/State/Zipcode  Phone Number

 

 [facility]  [address] [place] [address]  [phone]



documented in this encounter



Visit Diagnoses







 Diagnosis

 

 Status post total left knee replacement - Primary

 

 Primary osteoarthritis of both knees







 Primary localized osteoarthrosis, lower leg

 

 Osteoarthritis of both knees, unspecified osteoarthritis type

 

 Type 2 diabetes mellitus without complication, without long-term current use of



 insulin (HCC)

 

 Diabetes type 2, controlled (HCC)







 Type II or unspecified type diabetes mellitus without mention of complication, 
not



 stated as uncontrolled

 

 Low back pain







 Lumbago

 

 Osteoporosis







 Osteoporosis, unspecified



documented in this encounter



Administered Medications







 Medication Order  MAR Action  Action Date  Dose  Rate  Site

 

 acetaminophen (TYLENOL) tablet  Given  2019  6:19 AM CDT  650 mg    



 650 mg



          



 650 mg, Oral, Every six hours,          



 First dose on Wed 4/3/19 at 1800,          



 Until Discontinued, Post - Op,          



 Alternate with tramadol (ULTRAM);          



   Total dose of acetaminophen          



 from all acetaminophen containing          



 products should not exceed 4          



 grams (4000 mg) per day.,          









 Given  2019 12:09 AM CDT  650 mg    

 

 Given  2019  6:03 PM CDT  650 mg    









   









 apixaban (ELIQUIS) tablet 2.5 mg



  Given  2019  8:15 AM CDT  2.5 mg    



 2.5 mg, Oral, Two times a day, 24 doses,          



 First dose on Th19 at 0800, Last dose          



 on Mon 4/15/19 at 2000, Post - Op          









 Given  2019  8:37 PM CDT  2.5 mg    

 

 Given  2019  8:49 AM CDT  2.5 mg    









   









 bisacodyl (DULCOLAX) enteric coated tablet 5  Given  2019  6:19 AM CDT  
5 mg    



 mg



          



 5 mg, Oral, Two times a day prn, Starting Wed          



 4/3/19 at 1736, Until Discontinued,          



 constipation, Post - Op, SECOND choice or per          



 patient preference,          









 Given  2019 12:09 AM CDT  5 mg    









   









 bisacodyl (DULCOLAX) suppository 10 mg



  Given  2019  8:34 AM CDT  10 mg    



 10 mg, Rectal, One time a day prn, Starting          



 Wed 4/3/19 at 1736, Until Discontinued,          



 constipation, Post - Op, THIRD choice or per          



 patient preference.  If patient cannot take          



 oral medications, use first for          



 constipation.,          









   









 celecoxib (celeBREX) capsule 200 mg



  Given  2019  8:15 AM CDT  200 mg    



 200 mg, Oral, Two times a day, 20 doses,          



 First dose on Thu 19 at 2100, Last dose          



 on Sun 19 at 0900, Post - Op          









 Given  2019  8:37 PM CDT  200 mg    

 

 Given  2019  8:48 AM CDT  200 mg    









   









 metoclopramide (REGLAN) inj soln 10 mg



  Given  2019 10:12 AM CDT  10 mg    



 10 mg, IV, Every four hours prn, Starting Wed          



 4/3/19 at 1933, Until Discontinued, nausea,          



 vomiting, first choice, 2 mL, If preference          



 is to further dilute for IV administration:          



 First draw up patient-specific dose, then          



 dilute to 10 mL with 0.9% sodium chloride.,          









 Given  2019 10:15 PM CDT  10 mg    









   

 

 ondansetron (ZOFRAN ODT) dispersible tablet 8 mg



  



 8 mg, Oral, Every four hours prn, Starting Sat 19 at 0850, Until 
Discontinued,  



 nausea, vomiting, For partial dose (other than a full tablet), split tablet  



 (wearing gloves) just prior to administration. Gently remove from package by  



 peeling open immediately prior to administration. DO NOT push tablet through  



 package. Once dose is placed in mouth it will dissolve quickly.,  

 

   









 ondansetron (ZOFRAN) injection solution 4 mg



  Given  2019 11:42 AM CDT  4 mg    



 4 mg, IV, Every four hours prn, Starting Wed          



 4/3/19 at 1607, Until Discontinued, nausea,          



 vomiting, 2 mL, PACU - Continue Post-Op, Use          



 FIRST for nausea/vomiting. If ineffective          



 after 15 minutes use haloperidol if ordered          



 for nausea/vomiting. If preference is to          



 further dilute for IV administration: First          



 draw up patient-specific dose, then dilute to          



 10 mL with 0.9% sodium chloride.,          









   

 

 oxyCODONE (OXY-IR) tablet 5 mg



  



 5 mg, Oral, Every four hours prn, Starting 19 at 1115, Until 
Discontinued,  



 moderate pain, Post - Op  

 

   









 polyethylene glycol (MIRALAX) packet 1  Given  2019  8:15 AM CDT  1 
packet    



 packet



          



 1 packet, Oral, Daily, First dose on 19 at 0900, Until Discontinued, Post -          



 Op, Hold if 2 loose stools occur in the          



 last 24 hours.,          









 Given  2019  8:49 AM CDT  1 packet    

 

 Given  2019  9:54 AM CDT  1 packet    









   









 scopolamine (TRANSDERM-SCOP)  Applied  2019  9:31 AM CDT  1 patch    
Right Ear



 patch 1 patch



          



 1 patch, Transdermal, Every three          



 days, First dose on Sat 4/6/19 at          



 0855, Until Discontinued,          



 Administer over 3 Days          









   









 senna-docusate sodium  Given  2019  8:15 AM CDT  1 tablet    



 (SENOKOT-S;PERICOLACE) tablet 1 tablet



          



 1 tablet, Oral, Two times a day, First          



 dose on Wed 4/3/19 at 2100, Until          



 Discontinued, Post - Op, Hold if 2 loose          



 stools occur in the last 24 hours.,          









 Given  2019  8:41 PM CDT  1 tablet    

 

 Given  2019  8:49 AM CDT  1 tablet    









   









 sodium chloride 0.9% flush (adult) 10 mL



  Given  2019  8:39 PM CDT  10 mL    



 10 mL, IV, Two times a day and prn, First          



 dose on Wed 4/3/19 at 2100, Until          



 Discontinued, 10 mL, Post - Op, Flush IV line          



 as scheduled and as often as necessary before          



 and after meds.,          









 Given  2019  8:49 AM CDT  10 mL    

 

 Given  2019 10:18 AM CDT  10 mL    









   









 traMADol (ULTRAM) tablet 50 mg



  Given  2019  6:19 AM CDT  50 mg    



 50 mg, Oral, Every eight hours, First dose on          



 Wed 4/3/19 at 2200, Until Discontinued,          



 Alternate with acetaminophen (TYLENOL).          



 Recommended maximum daily dose of          



 traMADol=400 mg. Recommended maximum daily          



 dose in patients greater than 75 years of          



 age=300 mg,          









 Given  2019 10:00 PM CDT  50 mg    

 

 Given  2019  2:41 PM CDT  50 mg    









   









 









 Medication Order  MAR Action  Action Date  Dose  Rate  Site

 

 acetaminophen (TYLENOL) tablet  Given  2019 11:43 AM CDT  1,000 mg    



 1,000 mg



          



 1,000 mg, Oral, Pre-op, 1 dose,          



 Wed 4/3/19 at 1045, Pre - Op,          



 Total dose of acetaminophen          



 from all acetaminophen          



 containing products should not          



 exceed 4 grams (4000 mg) per          



 day.,          









   









 ceFAZolin (ANCEF) 2000 mg/20 mL sterile  Given  2019  2:20 PM CDT  2,000 
mg    



 water IV syringe



          



 2,000 mg, IV, Every eight hours, 3 doses,          



 First dose on Wed 4/3/19 at 2200, Last          



 dose on Thu 19 at 1400, 20 mL, PACU -          



 Continue Post-Op, Administer as IV push          



 over 4 minutes.,          









 Given  2019  6:35 AM CDT  2,000 mg    

 

 Given  2019  9:41 PM CDT  2,000 mg    









   









 gabapentin (NEURONTIN) capsule 600 mg



  Given  2019 11:43 AM CDT  600 mg    



 600 mg, Oral, Pre-op, 1 dose, Wed 4/3/19 at          



 1045, Pre - Op, 1 dose prior to surgery,          









   









 ketorolac (TORADOL) intravenous injection 15  Given  2019 11:43 AM CDT  
15 mg    



 mg



          



 15 mg, IV, Pre-op, 1 dose, Wed 4/3/19 at          



 1045, 1 mL, Pre - Op, 1 dose prior to surgery          



 If preference is to further dilute for IV          



 administration: First draw up          



 patient-specific dose, then dilute to 10 mL          



 with 0.9% sodium chloride.,          









   









 lactated ringers IV solution



  New Bag  2019  3:22 PM CDT      



 IV, at 25 mL/hr, Continuous, Starting Wed          



 4/3/19 at 1145, Until Wed 4/3/19 at 1559, 1,000          



 mL, Pre - Op          









 New Bag/Tubing  2019 11:42 AM CDT    25 mL/hr  









   









 lactated ringers IV solution



  Already Infusing  2019  4:10 PM CDT    125 mL/hr  



 IV, at 125 mL/hr, Continuous,          



 Starting Wed 4/3/19 at 1600,          



 Until Wed 4/3/19 at 1737,          



 1,000 mL, PACU, TKO current          



 fluids if patient is going to          



 Day Unit / ARU and tolerating          



 PO fluids without nausea.,          









   









 lidocaine PF (XYLOCAINE-MPF)  Given  2019 11:42 AM CDT  0.1 mL    Left 
Wrist Top IV



 1 % preservative free          



 injection solution 0.1-0.3 mL



          



 0.1-0.3 mL, Intradermal,          



 Pre-op, 1 dose, Wed 4/3/19 at          



 1145, 2 mL, Pre - Op, IV          



 start,          









   









 ondansetron (ZOFRAN) injection solution 4 mg



  Given  2019  5:19 PM CDT  4 mg    



 4 mg, IV, Every four hours prn, Starting Wed          



 4/3/19 at 1558, Until Wed 4/3/19 at 1737,          



 nausea, vomiting, 2 mL, PACU, Nausea/vomiting          



 orders if not on intrathecal/epidural; Use          



 first  Use only anesthesia's orders for          



 nausea/vomiting while in PACU or recovery care          



 If preference is to further dilute for IV          



 administration: First draw up patient-specific          



 dose, then dilute to 10 mL with 0.9% sodium          



 chloride.,          









   









 oxyCODONE (OXY-IR) tablet 5-10 mg



  Given  2019  4:38 AM CDT  10 mg    



 5-10 mg, Oral, Every four hours prn, Starting          



 Wed 4/3/19 at 1736, Until 19 at 1115,          



 moderate pain, severe pain, Post - Op, For          



 patients with moderate pain, pain rating of          



 4-6, give Oxycodone 5mg PO every 4 hours PRN.          



  For patients with severe pain, pain rating          



 of 7-10, give Oxycodone 10mg PO every 4 hours          



 PRN.,          









   









 scopolamine  Applied  2019  2:19 PM  1 patch    Behind Left Ear



 (TRANSDERM-SCOP) patch 1    CDT      Transdermal



 patch



          



 1 patch, Transdermal, One          



 time, 1 dose, Thu 19          



 at 1445, Administer over 3          



 Days          









   









 sodium chloride 0.9% IV solution



  New Bag  2019  1:27 AM CDT    75 mL/hr  



 IV, at 75 mL/hr, Continuous, Starting          



 Wed 4/3/19 at 1740, Until 19 at          



 1115, 1,000 mL, Post - Op          









 New Bag/Tubing  2019 11:59 AM CDT    75 mL/hr  

 

 New Bag  2019  6:54 PM CDT    75 mL/hr  









   









 traMADol (ULTRAM) tablet 100 mg



  Given  2019 11:43 AM CDT  100 mg    



 100 mg, Oral, Pre-op, 1 dose, Wed 4/3/19 at          



 1045, Pre - Op, Recommended maximum daily          



 dose of tramadol=400 mg. Recommended maximum          



 daily dose in patients 75 years or tpnya=268          



 mg.,          









   



documented in this encounter

## 2019-04-09 RX ADMIN — METFORMIN HYDROCHLORIDE SCH MG: 500 TABLET, EXTENDED RELEASE ORAL at 19:35

## 2019-04-09 RX ADMIN — VITAMIN D, TAB 1000IU (100/BT) SCH UNITS: 25 TAB at 08:01

## 2019-04-09 RX ADMIN — ONDANSETRON PRN MG: 4 TABLET, ORALLY DISINTEGRATING ORAL at 17:20

## 2019-04-10 RX ADMIN — METFORMIN HYDROCHLORIDE SCH MG: 500 TABLET, EXTENDED RELEASE ORAL at 19:41

## 2019-04-10 RX ADMIN — VITAMIN D, TAB 1000IU (100/BT) SCH UNITS: 25 TAB at 07:50

## 2019-04-10 RX ADMIN — ONDANSETRON PRN MG: 4 TABLET, ORALLY DISINTEGRATING ORAL at 09:23

## 2019-04-11 RX ADMIN — METFORMIN HYDROCHLORIDE SCH MG: 500 TABLET, EXTENDED RELEASE ORAL at 19:53

## 2019-04-11 RX ADMIN — VITAMIN D, TAB 1000IU (100/BT) SCH UNITS: 25 TAB at 07:42

## 2019-04-11 NOTE — PCM.PN
- General Info


Date of Service: 04/11/19


Admission Dx/Problem (Free Text): 


 Admission Diagnosis/Problem





Admission Diagnosis/Problem      Arthroplasty of knee








Functional Status: Reports: Pain Controlled, Tolerating Diet, Ambulating





- Review of Systems


General: Reports: Weakness (improving)


HEENT: Reports: Other (dizziness)


Pulmonary: Reports: No Symptoms


Cardiovascular: Reports: No Symptoms


Gastrointestinal: Reports: No Symptoms


Genitourinary: Reports: No Symptoms


Musculoskeletal: Reports: Joint Pain (knee)


Skin: Reports: No Symptoms


Neurological: Reports: Difficulty Walking (improving), Weakness


Psychiatric: Reports: No Symptoms





- Patient Data


Vitals - Most Recent: 


 Last Vital Signs











Temp  98.1 F   04/11/19 07:55


 


Pulse  68   04/11/19 07:55


 


Resp  16   04/11/19 07:55


 


BP  151/78 H  04/11/19 07:55


 


Pulse Ox  99   04/11/19 07:55








 





Orthostatic Blood Pressure [     149/60


Standing]                        


Orthostatic Blood Pressure [     134/54


Sitting]                         


Orthostatic Blood Pressure [     141/54


Supine]                          








Weight - Most Recent: 169 lb 8 oz


I&O - Last 24 Hours: 


 Intake & Output











 04/10/19 04/11/19 04/11/19





 22:59 06:59 14:59


 


Intake Total 480  360


 


Balance 480  360











Lab Results Last 24 Hours: 


 Laboratory Results - last 24 hr











  04/10/19 04/11/19 Range/Units





  16:46 07:20 


 


POC Glucose  167 H  97  ()  mg/dl











Med Orders - Current: 


 Current Medications





Acetaminophen (Tylenol)  650 mg PO Q6H PRN


   PRN Reason: Pain


Apixaban (Eliquis)  2.5 mg PO BID [facility]


   Stop: 04/15/19 08:01


   Last Admin: 04/11/19 07:42 Dose:  2.5 mg


Bisacodyl (Dulcolax)  5 mg PO DAILY PRN


   PRN Reason: Constipation


Cholecalciferol (Vitamin D3)  1,000 units PO DAILY [facility]


   Last Admin: 04/11/19 07:42 Dose:  1,000 units


Magnesium Oxide (Magnesium Oxide)  400 mg PO DAILY [facility]


   Last Admin: 04/11/19 07:43 Dose:  400 mg


Metformin HCl (Glucophage Xr)  500 mg PO BEDTIME [facility]


   Last Admin: 04/10/19 19:41 Dose:  500 mg


Miscellaneous Information (Remove Patch)  1 ea TRDERM ONETIME ONE


   Stop: 04/13/19 12:01


Temazepam (Restoril)  15 mg PO BEDTIME PRN


   PRN Reason: Sleep


Tramadol HCl (Ultram)  50 mg PO Q8HR [facility]


   Stop: 04/12/19 08:01


   Last Admin: 04/11/19 07:43 Dose:  50 mg


Tramadol HCl (Ultram)  50 mg PO Q8H PRN


   PRN Reason: Pain





Discontinued Medications





Acetaminophen (Tylenol)  650 mg PO Q6H PRN


   PRN Reason: Pain (Mild 1-3)/fever


Acetaminophen (Tylenol)  325 mg PO Q6HR [facility]


   Last Admin: 04/06/19 16:16 Dose:  Not Given


Acetaminophen (Tylenol)  650 mg PO Q6HR [facility]


   Stop: 04/11/19 10:01


   Last Admin: 04/11/19 11:27 Dose:  650 mg


Celecoxib (Celebrex)  200 mg PO BID [facility]


   Stop: 04/11/19 08:01


   Last Admin: 04/11/19 07:42 Dose:  200 mg


Ondansetron HCl (Zofran Odt)  4 mg PO Q6H PRN


   PRN Reason: Nausea/Vomiting


   Last Admin: 04/10/19 09:23 Dose:  4 mg


Oxycodone HCl (Oxycodone)  5 mg PO Q6HR PRN


   PRN Reason: Severe Pain


Polyethylene Glycol (Miralax)  17 gm PO DAILY [facility]


   Stop: 04/11/19 08:01


   Last Admin: 04/11/19 07:43 Dose:  17 gm


Scopolamine (Transderm-Scop)  1.5 mg TRDERM Q72H ONE


   Stop: 04/10/19 11:16


   Last Admin: 04/10/19 11:40 Dose:  1.5 mg











- Exam


Quality Assessment: Supplemental Oxygen, DVT Prophylaxis


General: Alert, Cooperative, Mild Distress


HEENT: Mucous Membr. Moist/Pink


Neck: Trachea Midline, No JVD, +2 Carotid Pulse wo Bruit


Lungs: Clear to Auscultation, Normal Respiratory Effort


Cardiovascular: Regular Rate, Regular Rhythm


GI/Abdominal Exam: Soft, Non-Tender, No Distention


 (Female) Exam: Deferred


Back Exam: Normal Inspection


Extremities: Normal Inspection, Non-Tender


Skin: Warm, Dry, Intact


Wound/Incisions: Healing Well, Dressing Dry and Intact, No Drainage


Neurological: No New Focal Deficit


Psy/Mental Status: Alert, Normal Affect, Normal Mood





- Problem List & Annotations


(1) S/P total knee arthroplasty


SNOMED Code(s): 1996378850183, 126828936, 6334497244963


   Code(s): Z96.659 - PRESENCE OF UNSPECIFIED ARTIFICIAL KNEE JOINT   Status: 

Acute   Priority: High   Current Visit: Yes   


Qualifiers: 


   Laterality: left   Qualified Code(s): Z96.652 - Presence of left artificial 

knee joint   


Annotation/Comment:: Admitted to Swing Bed for PT/OT s/p TKA   





(2) Postural dizziness with near syncope


SNOMED Code(s): 975112773


   Code(s): R42 - DIZZINESS AND GIDDINESS; R55 - SYNCOPE AND COLLAPSE   Status: 

Acute   Priority: High   Current Visit: Yes   





(3) Diabetes mellitus, type 2


SNOMED Code(s): 78314302


   Code(s): E11.9 - TYPE 2 DIABETES MELLITUS WITHOUT COMPLICATIONS   Status: 

Chronic   Priority: Low   Current Visit: Yes   


Qualifiers: 


   Diabetes mellitus long term insulin use: without long term use   Diabetes 

mellitus complication status: without complication   Qualified Code(s): E11.9 - 

Type 2 diabetes mellitus without complications   


Annotation/Comment:: Stable per Ca.  Will perform daily bedside glucose 

checks and A1C and follow patient's trends.  Patient currently on Metformin.  

Blood glucose levels have recently improved over her recent hospitalization 

most likely due to dietary changes and decreased overall PO intake.    





(4) Osteoarthritis


SNOMED Code(s): 880643221


   Code(s): M19.90 - UNSPECIFIED OSTEOARTHRITIS, UNSPECIFIED SITE   Status: 

Acute   Priority: Low   Current Visit: No   


Qualifiers: 


   Osteoarthritis location: unspecified site 


Annotation/Comment:: generalized osteoarthritis, stable overall   





(5) Hyperlipidemia


SNOMED Code(s): 26582000


   Code(s): E78.5 - HYPERLIPIDEMIA, UNSPECIFIED   Status: Chronic   Priority: 

Low   Current Visit: No   Annotation/Comment:: Stable per patient   





(6) Hypomagnesemia


SNOMED Code(s): 888683770


   Code(s): E83.42 - HYPOMAGNESEMIA   Status: Acute   Priority: Low   Current 

Visit: Yes   





- Problem List Review


Problem List Initiated/Reviewed/Updated: Yes





- My Orders


Last 24 Hours: 


My Active Orders





04/11/19 18:00


Acetaminophen [Tylenol]   650 mg PO Q6H PRN 





04/11/19 20:00


traMADol [Ultram]   50 mg PO Q8H PRN 





04/12/19 05:11


CBC WITH AUTO DIFF [HEME] Routine 


CMP [COMPREHENSIVE METABOLIC PN,CMP] [CHEM] Routine 


FOLATE [REF] Routine 


INR,PT,PROTHROMBIN TIME [COAG] Routine 


LACTIC ACID [CHEM] Routine 


MAGNESIUM [CHEM] Routine 


PTT,PARTIAL THROMBOPLSTIN TIME [COAG] Routine 


TSH ULTRASENSITIVE [CHEM] Routine 


VITAMIN B12 [CHEM] Routine 


VITAMIN D,25-HYDROXY [CHEM] Routine 














- Plan


Plan:: 





as above.  





04/11/19


Zoraida Ny MD


Making progress with PT. Pain medications are controlling her post op pain. She 

has episodes of postural dizziness even falling several time. Discussed with 

her. She lives in apartment alone. Discharge plans discussed.

## 2019-04-12 LAB
CHLORIDE SERPL-SCNC: 103 MMOL/L (ref 98–107)
SODIUM SERPL-SCNC: 137 MMOL/L (ref 136–145)

## 2019-04-12 RX ADMIN — VITAMIN D, TAB 1000IU (100/BT) SCH UNITS: 25 TAB at 17:46

## 2019-04-12 RX ADMIN — METFORMIN HYDROCHLORIDE SCH MG: 500 TABLET, EXTENDED RELEASE ORAL at 19:04

## 2019-04-12 RX ADMIN — VITAMIN D, TAB 1000IU (100/BT) SCH UNITS: 25 TAB at 07:32

## 2019-04-12 RX ADMIN — VITAMIN D, TAB 1000IU (100/BT) SCH UNITS: 25 TAB at 12:15

## 2019-04-13 RX ADMIN — VITAMIN D, TAB 1000IU (100/BT) SCH UNITS: 25 TAB at 17:12

## 2019-04-13 RX ADMIN — METFORMIN HYDROCHLORIDE SCH MG: 500 TABLET, EXTENDED RELEASE ORAL at 19:21

## 2019-04-13 RX ADMIN — VITAMIN D, TAB 1000IU (100/BT) SCH UNITS: 25 TAB at 07:35

## 2019-04-13 RX ADMIN — Medication PRN APPLIC: at 19:21

## 2019-04-13 RX ADMIN — VITAMIN D, TAB 1000IU (100/BT) SCH UNITS: 25 TAB at 11:43

## 2019-04-14 RX ADMIN — VITAMIN D, TAB 1000IU (100/BT) SCH UNITS: 25 TAB at 17:30

## 2019-04-14 RX ADMIN — METFORMIN HYDROCHLORIDE SCH MG: 500 TABLET, EXTENDED RELEASE ORAL at 19:19

## 2019-04-14 RX ADMIN — Medication PRN APPLIC: at 07:33

## 2019-04-14 RX ADMIN — VITAMIN D, TAB 1000IU (100/BT) SCH UNITS: 25 TAB at 07:34

## 2019-04-14 RX ADMIN — VITAMIN D, TAB 1000IU (100/BT) SCH UNITS: 25 TAB at 12:04

## 2019-04-14 RX ADMIN — Medication SCH EA: at 23:21

## 2019-04-14 RX ADMIN — Medication SCH: at 21:00

## 2019-04-14 NOTE — PCM.SN
- Free Text/Narrative


Note: 





04-14-19


MARIA ESTHER Andrade PA-C


Visited with patient about c/o nausea, which worsens when she tries to eat.  

She tells me she threw up her whole breakfast this morning.  She also c/o 

severe pain to upper arms and shoulders, actually over the trapezius and 

deltoid muscles, as she is using her arms so much to support and reposition 

herself since the knee surgery.  She had never taken Tramadol prior to this 

surgery so suspect nausea could be secondary to that med.  She has Tylenol prn.

  At home she would take two Aleve QAM and if needed at night and this 

controlled her chronic pain of arthritis, but she cannot take that med until 

Eliquis course completed which will be after the AM dose tomorrow.  Reviewed 

renal tests.  Discussion held with Nancy and her son.  Will order the 

Scopolamine patch again as that seemed to help.  Also Hydroxyzine 25mg to be 

given along with each prn dose of Tylenol Q6H, explained that this med can have 

a synergistic effect on the pain relief from Tylenol. Will order Aleve 220mg 2 

tabs BID prn to start tomorrow night.  Also ordered CBC and BMP for Wednesday, 

04-17-19.

## 2019-04-15 RX ADMIN — VITAMIN D, TAB 1000IU (100/BT) SCH UNITS: 25 TAB at 11:17

## 2019-04-15 RX ADMIN — METFORMIN HYDROCHLORIDE SCH MG: 500 TABLET, EXTENDED RELEASE ORAL at 19:33

## 2019-04-15 RX ADMIN — Medication PRN APPLIC: at 04:18

## 2019-04-15 RX ADMIN — Medication SCH EA: at 19:34

## 2019-04-15 RX ADMIN — Medication PRN APPLIC: at 19:33

## 2019-04-15 RX ADMIN — VITAMIN D, TAB 1000IU (100/BT) SCH UNITS: 25 TAB at 17:18

## 2019-04-15 RX ADMIN — VITAMIN D, TAB 1000IU (100/BT) SCH UNITS: 25 TAB at 07:50

## 2019-04-16 RX ADMIN — VITAMIN D, TAB 1000IU (100/BT) SCH UNITS: 25 TAB at 11:10

## 2019-04-16 RX ADMIN — METFORMIN HYDROCHLORIDE SCH MG: 500 TABLET, EXTENDED RELEASE ORAL at 19:23

## 2019-04-16 RX ADMIN — VITAMIN D, TAB 1000IU (100/BT) SCH UNITS: 25 TAB at 17:13

## 2019-04-16 RX ADMIN — Medication SCH EA: at 19:23

## 2019-04-16 RX ADMIN — VITAMIN D, TAB 1000IU (100/BT) SCH UNITS: 25 TAB at 08:04

## 2019-04-17 LAB
CHLORIDE SERPL-SCNC: 101 MMOL/L (ref 98–107)
SODIUM SERPL-SCNC: 136 MMOL/L (ref 136–145)

## 2019-04-17 RX ADMIN — METFORMIN HYDROCHLORIDE SCH MG: 500 TABLET, EXTENDED RELEASE ORAL at 19:21

## 2019-04-17 RX ADMIN — VITAMIN D, TAB 1000IU (100/BT) SCH UNITS: 25 TAB at 17:58

## 2019-04-17 RX ADMIN — Medication SCH EA: at 19:21

## 2019-04-17 RX ADMIN — VITAMIN D, TAB 1000IU (100/BT) SCH UNITS: 25 TAB at 08:22

## 2019-04-17 RX ADMIN — VITAMIN D, TAB 1000IU (100/BT) SCH UNITS: 25 TAB at 11:32

## 2019-04-18 RX ADMIN — VITAMIN D, TAB 1000IU (100/BT) SCH UNITS: 25 TAB at 17:14

## 2019-04-18 RX ADMIN — METFORMIN HYDROCHLORIDE SCH MG: 500 TABLET, EXTENDED RELEASE ORAL at 19:35

## 2019-04-18 RX ADMIN — VITAMIN D, TAB 1000IU (100/BT) SCH UNITS: 25 TAB at 08:13

## 2019-04-18 RX ADMIN — Medication PRN APPLIC: at 19:41

## 2019-04-18 RX ADMIN — VITAMIN D, TAB 1000IU (100/BT) SCH UNITS: 25 TAB at 11:22

## 2019-04-18 NOTE — PCM.PN
- General Info


Date of Service: 04/18/19


Admission Dx/Problem (Free Text): 


 Admission Diagnosis/Problem





Admission Diagnosis/Problem      Arthroplasty of knee








Functional Status: Reports: Pain Controlled, Tolerating Diet, Ambulating





- Review of Systems


General: Reports: No Symptoms


HEENT: Reports: No Symptoms


Pulmonary: Reports: No Symptoms


Cardiovascular: Reports: No Symptoms


Gastrointestinal: Reports: No Symptoms


Genitourinary: Reports: No Symptoms


Musculoskeletal: Reports: No Symptoms


Skin: Reports: No Symptoms


Neurological: Reports: No Symptoms


Psychiatric: Reports: No Symptoms





- Patient Data


Vitals - Most Recent: 


 Last Vital Signs











Temp  98.5 F   04/18/19 08:00


 


Pulse  72   04/18/19 08:00


 


Resp  17   04/18/19 08:00


 


BP  136/58 L  04/18/19 08:00


 


Pulse Ox  100   04/18/19 08:00








 





Orthostatic Blood Pressure [     149/60


Standing]                        


Orthostatic Blood Pressure [     134/54


Sitting]                         


Orthostatic Blood Pressure [     141/54


Supine]                          








Weight - Most Recent: 157 lb 14.4 oz


I&O - Last 24 Hours: 


 Intake & Output











 04/17/19 04/18/19 04/18/19





 22:59 06:59 14:59


 


Intake Total 200  440


 


Balance 200  440











Med Orders - Current: 


 Current Medications





Acetaminophen (Tylenol)  650 mg PO Q6H PRN


   PRN Reason: Pain


   Last Admin: 04/17/19 03:23 Dose:  650 mg


Bisacodyl (Dulcolax)  5 mg PO DAILY PRN


   PRN Reason: Constipation


Cholecalciferol (Vitamin D3)  2,000 units PO TID [facility]


   Last Admin: 04/18/19 11:22 Dose:  2,000 units


Lidocaine (Lidoderm 5%)  2,800 mg TOP DAILY@0800 [facility]


   Last Admin: 04/18/19 08:13 Dose:  2,800 mg


Magnesium Oxide (Magnesium Oxide)  400 mg PO DAILY [facility]


   Last Admin: 04/18/19 08:13 Dose:  400 mg


Metformin HCl (Glucophage Xr)  500 mg PO BEDTIME [facility]


   Last Admin: 04/17/19 19:21 Dose:  500 mg


Methyl Salicylate (Icy Hot Cream)  0 gm TOP QID PRN


   PRN Reason: Pain (mild 1-3)


   Last Admin: 04/15/19 19:33 Dose:  1 applic


Miscellaneous Information (Remove Patch)  1 ea TRDERM DAILY@2000 [facility]


   Last Admin: 04/17/19 19:21 Dose:  1 ea


Miscellaneous Information (Remove Patch)  1 ea TRDERM Q72H PRN


   PRN Reason: IF SCOPALAMINE PATCH BEING USE


   Last Admin: 04/17/19 11:38 Dose:  1 ea


Naproxen (Naproxen Sodium)  440 mg PO DAILY [facility]


   Last Admin: 04/18/19 08:13 Dose:  440 mg


Naproxen (Naproxen Sodium)  440 mg PO Q12HR PRN


   PRN Reason: Pain


   Last Admin: 04/17/19 19:21 Dose:  440 mg


Saliva Substitute (Jamaal-Stir Oral Spray)  0 ml MUCMEM ASDIRECTED PRN


   PRN Reason: Other


   Last Admin: 04/14/19 12:04 Dose:  1 spray


Scopolamine (Transderm-Scop)  1.5 mg TRDERM Q72H PRN


   PRN Reason: Nausea


   Last Admin: 04/14/19 12:05 Dose:  1.5 mg


Temazepam (Restoril)  15 mg PO BEDTIME PRN


   PRN Reason: Sleep


Tramadol HCl (Ultram)  50 mg PO Q8H PRN


   PRN Reason: Pain


   Last Admin: 04/16/19 09:19 Dose:  50 mg





Discontinued Medications





Acetaminophen (Tylenol)  650 mg PO Q6H PRN


   PRN Reason: Pain (Mild 1-3)/fever


Acetaminophen (Tylenol)  325 mg PO Q6HR [facility]


   Last Admin: 04/06/19 16:16 Dose:  Not Given


Acetaminophen (Tylenol)  650 mg PO Q6HR [facility]


   Stop: 04/11/19 10:01


   Last Admin: 04/11/19 11:27 Dose:  650 mg


Apixaban (Eliquis)  2.5 mg PO BID [facility]


   Stop: 04/15/19 08:01


   Last Admin: 04/15/19 07:49 Dose:  2.5 mg


Celecoxib (Celebrex)  200 mg PO BID [facility]


   Stop: 04/11/19 08:01


   Last Admin: 04/11/19 07:42 Dose:  200 mg


Celecoxib (Celebrex)  100 mg PO BID [facility]


   Last Admin: 04/16/19 08:04 Dose:  100 mg


Cholecalciferol (Vitamin D3)  1,000 units PO DAILY [facility]


   Last Admin: 04/12/19 07:32 Dose:  1,000 units


Hydroxyzine HCl (Vistaril)  25 mg IM ONETIME ONE


   Stop: 04/15/19 11:16


   Last Admin: 04/15/19 11:17 Dose:  25 mg


Hydroxyzine Pamoate (Vistaril)  25 mg PO Q6H PRN


   PRN Reason: Pain


   Last Admin: 04/17/19 03:24 Dose:  25 mg


Ketorolac Tromethamine (Toradol)  30 mg IM ONETIME ONE


   Stop: 04/15/19 11:00


   Last Admin: 04/15/19 11:27 Dose:  Not Given


Ketorolac Tromethamine (Toradol)  30 mg IM ONETIME ONE


   Stop: 04/15/19 11:16


   Last Admin: 04/15/19 11:17 Dose:  30 mg


Lidocaine (Lidoderm 5%)  0 mg TOP DAILY@0800 [facility]


   Last Admin: 04/15/19 08:39 Dose:  2,800 mg


Miscellaneous Information (Remove Patch)  1 ea TRDERM ONETIME ONE


   Stop: 04/13/19 12:01


   Last Admin: 04/13/19 11:42 Dose:  1 ea


Naproxen (Naproxen Sodium)  440 mg PO Q12HR PRN


   PRN Reason: Pain


Ondansetron HCl (Zofran Odt)  4 mg PO Q6H PRN


   PRN Reason: Nausea/Vomiting


   Last Admin: 04/10/19 09:23 Dose:  4 mg


Oxycodone HCl (Oxycodone)  5 mg PO Q6HR PRN


   PRN Reason: Severe Pain


Polyethylene Glycol (Miralax)  17 gm PO DAILY [facility]


   Stop: 04/11/19 08:01


   Last Admin: 04/11/19 07:43 Dose:  17 gm


Scopolamine (Transderm-Scop)  1.5 mg TRDERM Q72H ONE


   Stop: 04/10/19 11:16


   Last Admin: 04/10/19 11:40 Dose:  1.5 mg


Tramadol HCl (Ultram)  50 mg PO Q8HR [facility]


   Stop: 04/12/19 08:01


   Last Admin: 04/12/19 07:33 Dose:  50 mg











- Exam


General: Alert, Cooperative, No Acute Distress


HEENT: Mucous Membr. Moist/Pink


Neck: Trachea Midline, No JVD


Lungs: Clear to Auscultation, Normal Respiratory Effort


Cardiovascular: Regular Rate, Regular Rhythm


GI/Abdominal Exam: Soft, Non-Tender


 (Female) Exam: Deferred


Back Exam: Normal Inspection


Extremities: Normal Inspection, Non-Tender


Skin: Warm, Dry, Intact


Wound/Incisions: Healing Well, No Drainage


Neurological: No New Focal Deficit


Psy/Mental Status: Alert, Normal Affect, Normal Mood





- Problem List & Annotations


(1) S/P total knee arthroplasty


SNOMED Code(s): 3871086246803, 671692288, 9139368736612


   Code(s): Z96.659 - PRESENCE OF UNSPECIFIED ARTIFICIAL KNEE JOINT   Status: 

Acute   Priority: High   Current Visit: Yes   


Qualifiers: 


   Laterality: left   Qualified Code(s): Z96.652 - Presence of left artificial 

knee joint   


Annotation/Comment:: Admitted to Swing Bed for PT/OT s/p TKA   





(2) Postural dizziness with near syncope


SNOMED Code(s): 580291120


   Code(s): R42 - DIZZINESS AND GIDDINESS; R55 - SYNCOPE AND COLLAPSE   Status: 

Acute   Priority: High   Current Visit: Yes   





(3) Diabetes mellitus, type 2


SNOMED Code(s): 93465040


   Code(s): E11.9 - TYPE 2 DIABETES MELLITUS WITHOUT COMPLICATIONS   Status: 

Chronic   Priority: Low   Current Visit: Yes   


Qualifiers: 


   Diabetes mellitus long term insulin use: without long term use   Diabetes 

mellitus complication status: without complication   Qualified Code(s): E11.9 - 

Type 2 diabetes mellitus without complications   


Annotation/Comment:: Stable per Ca.  Will perform daily bedside glucose 

checks and A1C and follow patient's trends.  Patient currently on Metformin.  

Blood glucose levels have recently improved over her recent hospitalization 

most likely due to dietary changes and decreased overall PO intake.    





(4) Osteoarthritis


SNOMED Code(s): 989338876


   Code(s): M19.90 - UNSPECIFIED OSTEOARTHRITIS, UNSPECIFIED SITE   Status: 

Acute   Priority: Low   Current Visit: No   


Qualifiers: 


   Osteoarthritis location: unspecified site 


Annotation/Comment:: generalized osteoarthritis, stable overall   





(5) Hyperlipidemia


SNOMED Code(s): 22228128


   Code(s): E78.5 - HYPERLIPIDEMIA, UNSPECIFIED   Status: Chronic   Priority: 

Low   Current Visit: No   Annotation/Comment:: Stable per patient   





(6) Hypomagnesemia


SNOMED Code(s): 287057178


   Code(s): E83.42 - HYPOMAGNESEMIA   Status: Acute   Priority: Low   Current 

Visit: Yes   





- Problem List Review


Problem List Initiated/Reviewed/Updated: Yes





- My Orders


Last 24 Hours: 


My Active Orders





04/17/19 12:00


Remove Patch   1 ea TRDERM Q72H PRN 














- Plan


Plan:: 





as above.  





04/11/19


Zoraida Ny MD


Making progress with PT. Pain medications are controlling her post op pain. She 

has episodes of postural dizziness even falling several time. Discussed with 

her. She lives in apartment alone. Discharge plans discussed.





04/18/19


Zoraida Ny MD


Feeling much better now back on her Aleve. Discharge plans discussed with her.

## 2019-04-19 RX ADMIN — VITAMIN D, TAB 1000IU (100/BT) SCH UNITS: 25 TAB at 12:26

## 2019-04-19 RX ADMIN — VITAMIN D, TAB 1000IU (100/BT) SCH UNITS: 25 TAB at 08:06

## 2019-04-19 NOTE — PCM.DCSUM1
**Discharge Summary





- Hospital Course


Diagnosis: Stroke: No





- Discharge Data


Discharge Date: 04/19/19


Discharge Disposition: Home, Self-Care 01


Condition: Good





- Discharge Diagnosis/Problem(s)


(1) S/P total knee arthroplasty


SNOMED Code(s): 8929430105502, 816094165, 1974874715856


   ICD Code: Z96.659 - PRESENCE OF UNSPECIFIED ARTIFICIAL KNEE JOINT   Status: 

Acute   Priority: High   Current Visit: Yes   Problem Details: Admitted to 

Swing Bed for PT/OT s/p TKA   


Qualifiers: 


   Laterality: left   Qualified Code(s): Z96.652 - Presence of left artificial 

knee joint   





(2) Postural dizziness with near syncope


SNOMED Code(s): 552509300


   ICD Code: R42 - DIZZINESS AND GIDDINESS; R55 - SYNCOPE AND COLLAPSE   Status

: Acute   Priority: High   Current Visit: Yes   





(3) Diabetes mellitus, type 2


SNOMED Code(s): 34178229


   ICD Code: E11.9 - TYPE 2 DIABETES MELLITUS WITHOUT COMPLICATIONS   Status: 

Chronic   Priority: Low   Current Visit: Yes   Problem Details: Stable per 

Ca.  Will perform daily bedside glucose checks and A1C and follow patient'

s trends.  Patient currently on Metformin.  Blood glucose levels have recently 

improved over her recent hospitalization most likely due to dietary changes and 

decreased overall PO intake.    


Qualifiers: 


   Diabetes mellitus long term insulin use: without long term use   Diabetes 

mellitus complication status: without complication   Qualified Code(s): E11.9 - 

Type 2 diabetes mellitus without complications   





(4) Osteoarthritis


SNOMED Code(s): 493039334


   ICD Code: M19.90 - UNSPECIFIED OSTEOARTHRITIS, UNSPECIFIED SITE   Status: 

Acute   Priority: Low   Current Visit: No   Problem Details: generalized 

osteoarthritis, stable overall   


Qualifiers: 


   Osteoarthritis location: unspecified site 





(5) Hyperlipidemia


SNOMED Code(s): 43249654


   ICD Code: E78.5 - HYPERLIPIDEMIA, UNSPECIFIED   Status: Chronic   Priority: 

Low   Current Visit: No   Problem Details: Stable per patient   





(6) Hypomagnesemia


SNOMED Code(s): 145182469


   ICD Code: E83.42 - HYPOMAGNESEMIA   Status: Acute   Priority: Low   Current 

Visit: Yes   





- Patient Summary/Data


Consults: 


 Consultations





04/06/19 13:31


Consult to Case Management/ [CONS] Routine 


OT Evaluation and Treatment [CONS] Routine 


PT Evaluation and Treatment [CONS] Routine 














- Patient Instructions


Diet: Diabetic Diet


Activity: As Tolerated


Driving: Do Not Drive


Showering/Bathing: May Shower


Wound/Incision Care: Keep Operative Site/Wound Site Clean and Dry


Notify Provider of: Fever, Increased Pain, Swelling and Redness, Drainage


Other/Special Instructions: Keep your orthopedic surgery appointment as already 

schduled. Follow up at [facility] with Cindy. Have a vitamin D 

blood test done in 2-3 months.





- Discharge Plan


*PRESCRIPTION DRUG MONITORING PROGRAM REVIEWED*: Not Applicable


*COPY OF PRESCRIPTION DRUG MONITORING REPORT IN PATIENT HEAVEN: Not Applicable


Prescriptions/Med Rec: 


Cholecalciferol (Vitamin D3) [Vitamin D3] 5,000 unit PO DAILY #100 tablet


Naproxen Sodium [Aleve] 440 mg PO BID PRN #100 tab


 PRN Reason: Pain


Home Medications: 


 Home Meds





Acetaminophen 2 tab PO Q6HR 04/06/19 [History]


Polyethylene Glycol 3350 [MiraLAX] 17 gm PO DAILY 04/06/19 [History]


metFORMIN [Glucophage XR] 500 mg PO BEDTIME 04/06/19 [History]


Cholecalciferol (Vitamin D3) [Vitamin D3] 5,000 unit PO DAILY #100 tablet 04/19/ 19 [Rx]


Magnesium Oxide 400 mg PO DAILY  tablet 04/19/19 [Rx]


Naproxen Sodium [Aleve] 440 mg PO BID PRN #100 tab 04/19/19 [Rx]








Oxygen Therapy Mode: Room Air


Patient Handouts:  Hydralazine tablets





- Discharge Summary/Plan Comment


DC Time >30 min.: No





- Patient Data


Vitals - Most Recent: 


 Last Vital Signs











Temp  98.3 F   04/19/19 05:13


 


Pulse  86   04/19/19 05:13


 


Resp  15   04/19/19 05:13


 


BP  143/59 H  04/19/19 05:13


 


Pulse Ox  98   04/19/19 05:13








 





Orthostatic Blood Pressure [     149/60


Standing]                        


Orthostatic Blood Pressure [     134/54


Sitting]                         


Orthostatic Blood Pressure [     141/54


Supine]                          








Weight - Most Recent: 157 lb 14.4 oz


I&O - Last 24 hours: 


 Intake & Output











 04/18/19 04/19/19 04/19/19





 22:59 06:59 14:59


 


Intake Total 200  400


 


Balance 200  400











Med Orders - Current: 


 Current Medications





Acetaminophen (Tylenol)  650 mg PO Q6H PRN


   PRN Reason: Pain


   Last Admin: 04/19/19 05:12 Dose:  650 mg


Bisacodyl (Dulcolax)  5 mg PO DAILY PRN


   PRN Reason: Constipation


Cholecalciferol (Vitamin D3)  2,000 units PO TID [facility]


   Last Admin: 04/19/19 12:26 Dose:  2,000 units


Magnesium Oxide (Magnesium Oxide)  400 mg PO DAILY [facility]


   Last Admin: 04/19/19 08:06 Dose:  400 mg


Metformin HCl (Glucophage Xr)  500 mg PO BEDTIME [facility]


   Last Admin: 04/18/19 19:35 Dose:  500 mg


Methyl Salicylate (Icy Hot Cream)  0 gm TOP QID PRN


   PRN Reason: Pain (mild 1-3)


   Last Admin: 04/18/19 19:41 Dose:  1 applic


Naproxen (Naproxen Sodium)  440 mg PO DAILY [facility]


   Last Admin: 04/19/19 08:07 Dose:  440 mg


Naproxen (Naproxen Sodium)  440 mg PO Q12HR PRN


   PRN Reason: Pain


   Last Admin: 04/18/19 19:40 Dose:  440 mg


Saliva Substitute (Jamaal-Stir Oral Spray)  0 ml MUCMEM ASDIRECTED PRN


   PRN Reason: Other


   Last Admin: 04/14/19 12:04 Dose:  1 spray


Temazepam (Restoril)  15 mg PO BEDTIME PRN


   PRN Reason: Sleep


Tramadol HCl (Ultram)  50 mg PO Q8H PRN


   PRN Reason: Pain


   Last Admin: 04/16/19 09:19 Dose:  50 mg





Discontinued Medications





Acetaminophen (Tylenol)  650 mg PO Q6H PRN


   PRN Reason: Pain (Mild 1-3)/fever


Acetaminophen (Tylenol)  325 mg PO Q6HR [facility]


   Last Admin: 04/06/19 16:16 Dose:  Not Given


Acetaminophen (Tylenol)  650 mg PO Q6HR [facility]


   Stop: 04/11/19 10:01


   Last Admin: 04/11/19 11:27 Dose:  650 mg


Apixaban (Eliquis)  2.5 mg PO BID [facility]


   Stop: 04/15/19 08:01


   Last Admin: 04/15/19 07:49 Dose:  2.5 mg


Celecoxib (Celebrex)  200 mg PO BID [facility]


   Stop: 04/11/19 08:01


   Last Admin: 04/11/19 07:42 Dose:  200 mg


Celecoxib (Celebrex)  100 mg PO BID [facility]


   Last Admin: 04/16/19 08:04 Dose:  100 mg


Cholecalciferol (Vitamin D3)  1,000 units PO DAILY [facility]


   Last Admin: 04/12/19 07:32 Dose:  1,000 units


Hydroxyzine HCl (Vistaril)  25 mg IM ONETIME ONE


   Stop: 04/15/19 11:16


   Last Admin: 04/15/19 11:17 Dose:  25 mg


Hydroxyzine Pamoate (Vistaril)  25 mg PO Q6H PRN


   PRN Reason: Pain


   Last Admin: 04/17/19 03:24 Dose:  25 mg


Ketorolac Tromethamine (Toradol)  30 mg IM ONETIME ONE


   Stop: 04/15/19 11:00


   Last Admin: 04/15/19 11:27 Dose:  Not Given


Ketorolac Tromethamine (Toradol)  30 mg IM ONETIME ONE


   Stop: 04/15/19 11:16


   Last Admin: 04/15/19 11:17 Dose:  30 mg


Lidocaine (Lidoderm 5%)  0 mg TOP DAILY@0800 [facility]


   Last Admin: 04/15/19 08:39 Dose:  2,800 mg


Lidocaine (Lidoderm 5%)  2,800 mg TOP DAILY@0800 [facility]


   Last Admin: 04/18/19 08:13 Dose:  2,800 mg


Miscellaneous Information (Remove Patch)  1 ea TRDERM ONETIME ONE


   Stop: 04/13/19 12:01


   Last Admin: 04/13/19 11:42 Dose:  1 ea


Miscellaneous Information (Remove Patch)  1 ea TRDERM DAILY@2000 [facility]


   Last Admin: 04/17/19 19:21 Dose:  1 ea


Miscellaneous Information (Remove Patch)  1 ea TRDERM Q72H PRN


   PRN Reason: IF SCOPALAMINE PATCH BEING USE


   Last Admin: 04/17/19 11:38 Dose:  1 ea


Naproxen (Naproxen Sodium)  440 mg PO Q12HR PRN


   PRN Reason: Pain


Ondansetron HCl (Zofran Odt)  4 mg PO Q6H PRN


   PRN Reason: Nausea/Vomiting


   Last Admin: 04/10/19 09:23 Dose:  4 mg


Oxycodone HCl (Oxycodone)  5 mg PO Q6HR PRN


   PRN Reason: Severe Pain


Polyethylene Glycol (Miralax)  17 gm PO DAILY [facility]


   Stop: 04/11/19 08:01


   Last Admin: 04/11/19 07:43 Dose:  17 gm


Scopolamine (Transderm-Scop)  1.5 mg TRDERM Q72H ONE


   Stop: 04/10/19 11:16


   Last Admin: 04/10/19 11:40 Dose:  1.5 mg


Scopolamine (Transderm-Scop)  1.5 mg TRDERM Q72H PRN


   PRN Reason: Nausea


   Last Admin: 04/14/19 12:05 Dose:  1.5 mg


Tramadol HCl (Ultram)  50 mg PO Q8HR [facility]


   Stop: 04/12/19 08:01


   Last Admin: 04/12/19 07:33 Dose:  50 mg